# Patient Record
Sex: FEMALE | NOT HISPANIC OR LATINO | URBAN - METROPOLITAN AREA
[De-identification: names, ages, dates, MRNs, and addresses within clinical notes are randomized per-mention and may not be internally consistent; named-entity substitution may affect disease eponyms.]

---

## 2018-03-29 ENCOUNTER — TRANSCRIBE ORDERS (OUTPATIENT)
Dept: PHYSICAL THERAPY | Facility: HOSPITAL | Age: 68
End: 2018-03-29

## 2018-03-29 DIAGNOSIS — G89.29 CHRONIC BILATERAL BACK PAIN, UNSPECIFIED BACK LOCATION: Primary | ICD-10-CM

## 2018-03-29 DIAGNOSIS — M25.562 LEFT KNEE PAIN, UNSPECIFIED CHRONICITY: ICD-10-CM

## 2018-03-29 DIAGNOSIS — M54.9 CHRONIC BILATERAL BACK PAIN, UNSPECIFIED BACK LOCATION: Primary | ICD-10-CM

## 2018-03-29 DIAGNOSIS — M25.552 PAIN OF LEFT HIP JOINT: ICD-10-CM

## 2018-04-04 ENCOUNTER — APPOINTMENT (OUTPATIENT)
Dept: PHYSICAL THERAPY | Facility: HOSPITAL | Age: 68
End: 2018-04-04

## 2018-05-01 ENCOUNTER — HOSPITAL ENCOUNTER (OUTPATIENT)
Dept: PHYSICAL THERAPY | Facility: HOSPITAL | Age: 68
Setting detail: THERAPIES SERIES
Discharge: HOME | End: 2018-05-01

## 2018-05-09 ENCOUNTER — HOSPITAL ENCOUNTER (OUTPATIENT)
Dept: PHYSICAL THERAPY | Facility: HOSPITAL | Age: 68
Setting detail: THERAPIES SERIES
Discharge: HOME | End: 2018-05-09

## 2018-05-09 DIAGNOSIS — R53.1 GENERALIZED WEAKNESS: ICD-10-CM

## 2018-05-09 DIAGNOSIS — M54.50 CHRONIC BILATERAL LOW BACK PAIN WITHOUT SCIATICA: Primary | ICD-10-CM

## 2018-05-09 DIAGNOSIS — M25.562 CHRONIC PAIN OF LEFT KNEE: ICD-10-CM

## 2018-05-09 DIAGNOSIS — M19.90 ARTHRITIS: ICD-10-CM

## 2018-05-09 DIAGNOSIS — G89.29 CHRONIC BILATERAL LOW BACK PAIN WITHOUT SCIATICA: Primary | ICD-10-CM

## 2018-05-09 DIAGNOSIS — G89.29 CHRONIC PAIN OF LEFT KNEE: ICD-10-CM

## 2018-05-09 DIAGNOSIS — M25.50 MULTIPLE JOINT PAIN: ICD-10-CM

## 2018-05-09 PROCEDURE — G8979 MOBILITY GOAL STATUS: HCPCS | Performed by: PHYSICAL THERAPIST

## 2018-05-09 PROCEDURE — G8978 MOBILITY CURRENT STATUS: HCPCS | Performed by: PHYSICAL THERAPIST

## 2018-05-09 PROCEDURE — 97161 PT EVAL LOW COMPLEX 20 MIN: CPT | Performed by: PHYSICAL THERAPIST

## 2018-05-09 NOTE — THERAPY EVALUATION
Outpatient Physical Therapy Ortho Initial Evaluation   [facility]     Patient Name: Radha Del Rosario  : 1950  MRN: [id number]  Today's Date: 2018      Visit Date: 2018    There is no problem list on file for this patient.       No past medical history on file.     No past surgical history on file.    Visit Dx:     ICD-10-CM ICD-9-CM   1. Chronic bilateral low back pain without sciatica M54.5 724.2    G89.29 338.29   2. Chronic pain of left knee M25.562 719.46    G89.29 338.29   3. Generalized weakness R53.1 780.79   4. Multiple joint pain M25.50 719.49   5. Arthritis M19.90 716.90                 PT Ortho     Row Name 18 1600       Subjective Comments    Subjective Comments 67 y/o F referred to outpatient PT for LB, L hip and L knee pain. Also with c/o upper back and R sided pain.  Her dtr in law provides translation.  She c/o pain that is worse with walking. Her low back disturbs her sleep. She denies pain with sitting. She c/o intermittent numbness in her L ankle.  Bending forward provokes lumbar pain.  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  Currently does not participate in an exercise program   -GR       Subjective Pain    Able to rate subjective pain? yes  -GR    Pre-Treatment Pain Level 6  -GR    Post-Treatment Pain Level 6  -GR    Subjective Pain Comment generalized  -GR       Posture/Observations    Posture/Observations Comments Mild forward head, decreased lordosis  -GR       Quarter Clearing    Quarter Clearing Lower Quarter Clearing  -GR       DTR- Lower Quarter Clearing    Patellar tendon (L2-4) 2- Normal response  -GR    Achilles tendon (S1-2) 2- Normal response  -GR       Neural Tension Signs- Lower Quarter Clearing    SLR Left:;Positive  -GR       Myotomal Screen- Lower Quarter Clearing    Hip flexion (L2) Bilateral:;4 (Good)  -GR    Knee extension (L3) Bilateral:;4- (Good -)   -GR    Ankle DF (L4) Bilateral:;4 (Good)  -GR    Great toe extension (L5) Bilateral:;4 (Good)  -GR    Ankle PF (S1) Bilateral:;4 (Good)  -GR    Knee flexion (S2) Bilateral:;4- (Good -)  -GR       Lumbar ROM Screen- Lower Quarter Clearing    Lumbar Flexion Normal   with pain  -GR    Lumbar Extension Impaired   25% with pain  -GR    Lumbar Lateral Flexion Normal   past knee jt line  -GR    Lumbar Rotation Impaired   25% with pain R>L  -GR       SI/Hip Screen- Lower Quarter Clearing    Koko's/Colin's test Bilateral:;Negative  -GR       Special Tests/Palpation    Special Tests/Palpation Knee  -GR       Knee Special Tests    Lachman’s (ACL lesion) Bilateral:;Negative  -GR    Posterior drawer (PCL lesion) Bilateral:;Negative  -GR    Valgus stress (MCL lesion) Bilateral:;Negative  -GR    Varus stress (LCL lesion) Bilateral:;Negative  -GR    Knee Special Tests Comments TTP medial jt line L knee; B jt line R knee  -GR       General ROM    GENERAL ROM COMMENTS knee flex/ext 0-135 bilaterally  -GR      User Key  (r) = Recorded By, (t) = Taken By, (c) = Cosigned By    Initials Name Provider Type    GR Gerhard Varner, PT Physical Therapist                      Therapy Education  Education Details: educated family on arthritis and effects of mobility training; issued initial HEP and discussed follow up plan/expectations  Given: HEP, Symptoms/condition management  Program: New  How Provided: Verbal, Written  Provided to: Patient  Level of Understanding: Teach back education performed, Demonstrated           PT OP Goals     Row Name 05/09/18 1800          PT Short Term Goals    STG Date to Achieve 05/24/18  -GR     STG 1 Patient/family will be independent with completion of initial HEP.  -GR     STG 1 Progress New  -GR     STG 2 Patient will be compliant with walking >10 minutes >3 days a week for optimal symptom management.  -GR     STG 2 Progress New  -GR     STG 3 Patient will report 25% reduction in pain with sleeping.   -GR     STG 3 Progress New  -GR        Long Term Goals    LTG Date to Achieve 06/23/18  -GR     LTG 1 Patient/family will be independent with progressive HEP for long term management of current condition.  -GR     LTG 1 Progress New  -GR     LTG 2 Patient will score </=38% disability on the KOS-ADL to indicate improved perceived performance with ADLs.  -GR     LTG 2 Progress New  -GR     LTG 3 Patient will report 75% or greater reduction in pain with sleeping.  -GR     LTG 3 Progress New  -GR     LTG 4 Patient will be compliant with walking >/=30 minutes/day 3x/week for optimal arthritis management.  -GR     LTG 4 Progress New  -GR        Time Calculation    PT Goal Re-Cert Due Date 08/07/18  -GR       User Key  (r) = Recorded By, (t) = Taken By, (c) = Cosigned By    Initials Name Provider Type    LEFTY Varner, PT Physical Therapist                PT Assessment/Plan     Row Name 05/09/18 1828          PT Assessment    Functional Limitations Limitation in home management;Performance in leisure activities  -     Impairments Endurance;Gait;Pain;Posture;Poor body mechanics;Muscle strength  -GR     Assessment Comments 67 y/o F referred to outpatient PT for evaluation of back, L hip and L knee pain; patient with c/o widespread jt pain, insidious onset x 1 year. Translation provided by patient son and dtr in law at her request (declined interpretive services).  She presents with limited and painful lumbar ext and rotation.  Myotome strength is good.  Note palpable tenderness to the medial jt line of her L knee and both lateral and medial R knee jt lines.  Special testing is unremarkable. Her s/s are consistent with arthritis and she is likely to benefit from skilled PT intervention to address her current impairments. Recommend trial 2x/week x 4 weeks with goal to transition to independent home program for long term condition management. Her condition is stable.  Thank you for this referral.  -GR     Please refer  to paper survey for additional self-reported information Yes  -GR     Rehab Potential Good  -GR     Patient/caregiver participated in establishment of treatment plan and goals Yes  -GR     Patient would benefit from skilled therapy intervention Yes  -GR        PT Plan    PT Frequency 2x/week  -GR     Predicted Duration of Therapy Intervention (OT Eval) 4 weeks  -GR     Planned CPT's? PT EVAL LOW COMPLEXITY: 06324;PT RE-EVAL: 83867;PT THER PROC EA 15 MIN: 90247;PT THER ACT EA 15 MIN: 05038;PT MANUAL THERAPY EA 15 MIN: 60061;PT NEUROMUSC RE-EDUCATION EA 15 MIN: 46642;PT GAIT TRAINING EA 15 MIN: 95449;PT AQUATIC THERAPY EA 15 MIN: 36828;PT SELF CARE/HOME MGMT/TRAIN EA 15: 84385;PT HOT OR COLD PACK TREAT MCARE;PT ELECTRICAL STIM UNATTEND: ;PT ULTRASOUND EA 15 MIN: 75412  -GR     Physical Therapy Interventions (Optional Details) aquatics exercise;balance training;home exercise program;gait training;lumbar stabilization;manual therapy techniques;modalities;neuromuscular re-education;patient/family education;postural re-education;ROM (Range of Motion);strengthening;stretching;swiss ball techniques;transfer training  -GR     PT Plan Comments Begin with nustep. Review/perform HEP.  Progress with gentle aerobic activity and lower body/core strengthening within patient tolerance.  -GR       User Key  (r) = Recorded By, (t) = Taken By, (c) = Cosigned By    Initials Name Provider Type    GR Gerhard T Gardenia, PT Physical Therapist                  Exercises     Row Name 05/09/18 1600             Subjective Comments    Subjective Comments 67 y/o F referred to outpatient PT for LB, L hip and L knee pain. Also with c/o upper back and R sided pain.  Her dtr in law provides translation.  She c/o pain that is worse with walking. Her low back disturbs her sleep. She denies pain with sitting. She c/o intermittent numbness in her L ankle.  Bending forward provokes lumbar pain.  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  Currently does not participate in an exercise program   -GR         Subjective Pain    Able to rate subjective pain? yes  -GR      Pre-Treatment Pain Level 6  -GR      Post-Treatment Pain Level 6  -GR      Subjective Pain Comment generalized  -GR         Exercise 1    Exercise Name 1 Quad set B  -GR      Cueing 1 Demo;Tactile  -GR      Sets 1 1  -GR      Reps 1 10  -GR         Exercise 2    Exercise Name 2 LTR  -GR      Cueing 2 Demo;Tactile  -GR      Sets 2 1  -GR      Reps 2 10  -GR         Exercise 3    Exercise Name 3 Piriformis stretch H/L  -GR      Cueing 3 Demo;Tactile  -GR      Sets 3 1  -GR      Reps 3 3  -GR      Time 3 20 seconds  -GR         Exercise 4    Exercise Name 4 SKTC  -GR      Cueing 4 Demo;Tactile  -GR      Sets 4 1  -GR      Reps 4 3  -GR      Time 4 5 seconds  -GR         Exercise 5    Exercise Name 5 Nustep 1 min - demo  -GR        User Key  (r) = Recorded By, (t) = Taken By, (c) = Cosigned By    Initials Name Provider Type    GR Gerhard Varner, PT Physical Therapist                        Outcome Measure Options: Knee Outcome Score- ADL  Knee Outcome Score  Knee Outcome Score Comments: 48.75% disability      Time Calculation:   Start Time: 1600  Stop Time: 1632  Time Calculation (min): 32 min  Total Timed Code Minutes- PT: 0 minute(s)     Therapy Charges for Today     Code Description Service Date Service Provider Modifiers Qty    77997631863 HC PT MOBILITY CURRENT 5/9/2018 Gerhard Varner, PT GP, CK 1    32982471621 HC PT MOBILITY PROJECTED 5/9/2018 Gerhard Varner PT GP, CI 1    61466164240 HC PT EVAL LOW COMPLEXITY 2 5/9/2018 Gerhard Varner, PT GP 1          PT G-Codes  PT Professional Judgement Used?: Yes  Outcome Measure Options: Knee Outcome Score- ADL  Score: 48%  Functional Limitation: Mobility: Walking and moving around  Mobility: Walking and Moving Around Current Status (): At least 40  percent but less than 60 percent impaired, limited or restricted  Mobility: Walking and Moving Around Goal Status (): At least 1 percent but less than 20 percent impaired, limited or restricted         Gerhard Varner, PT  5/9/2018

## 2018-05-17 ENCOUNTER — HOSPITAL ENCOUNTER (OUTPATIENT)
Dept: PHYSICAL THERAPY | Facility: HOSPITAL | Age: 68
Setting detail: THERAPIES SERIES
Discharge: HOME | End: 2018-05-17

## 2018-05-17 DIAGNOSIS — M25.50 MULTIPLE JOINT PAIN: ICD-10-CM

## 2018-05-17 DIAGNOSIS — G89.29 CHRONIC BILATERAL LOW BACK PAIN WITHOUT SCIATICA: Primary | ICD-10-CM

## 2018-05-17 DIAGNOSIS — R53.1 GENERALIZED WEAKNESS: ICD-10-CM

## 2018-05-17 DIAGNOSIS — G89.29 CHRONIC PAIN OF LEFT KNEE: ICD-10-CM

## 2018-05-17 DIAGNOSIS — M19.90 ARTHRITIS: ICD-10-CM

## 2018-05-17 DIAGNOSIS — M54.50 CHRONIC BILATERAL LOW BACK PAIN WITHOUT SCIATICA: Primary | ICD-10-CM

## 2018-05-17 DIAGNOSIS — M25.562 CHRONIC PAIN OF LEFT KNEE: ICD-10-CM

## 2018-05-17 PROCEDURE — 97110 THERAPEUTIC EXERCISES: CPT

## 2018-05-17 NOTE — THERAPY TREATMENT NOTE
Outpatient Physical Therapy Ortho Treatment Note   [facility]     Patient Name: Radha Del Rosario  : 1950  MRN: [id number]  Today's Date: 2018      Visit Date: 2018    Visit Dx:    ICD-10-CM ICD-9-CM   1. Chronic bilateral low back pain without sciatica M54.5 724.2    G89.29 338.29   2. Chronic pain of left knee M25.562 719.46    G89.29 338.29   3. Generalized weakness R53.1 780.79   4. Multiple joint pain M25.50 719.49   5. Arthritis M19.90 716.90       There is no problem list on file for this patient.       No past medical history on file.     No past surgical history on file.                          PT Assessment/Plan     Row Name 18 1725          PT Assessment    Assessment Comments Pt returns for first follow up visit since evaluation reporting dec overall pain with 5/10 pain in L hip and thoracic spine. Daughter in law present to translate today. Pt tolerated LE flexibility and low level strengthening well. She had difficulty performing HL TA with dec ability to perform PPT and difficulty comprehending instructions. Pt reports 0/10 pain following PT.   -LS        PT Plan    PT Plan Comments Assess tolerance to HEP, attempt increased repetitions of exercises, continue general strengthening to address core weakness, improve arthritic joint pain.   -LS       User Key  (r) = Recorded By, (t) = Taken By, (c) = Cosigned By    Initials Name Provider Type    LS Maya Rosen, PT Physical Therapist                    Exercises     Row Name 18 1600             Subjective Comments    Subjective Comments I feel better today. I have pain in my L hip.  -LS         Subjective Pain    Able to rate subjective pain? yes  -LS      Pre-Treatment Pain Level 5  -LS         Exercise 1    Exercise Name 1 Quad set B  -LS      Cueing 1 Demo;Tactile  -LS      Sets 1 1  -LS      Reps 1 10  -LS         Exercise 2    Exercise Name 2 LTR  -LS      Cueing 2 Demo;Tactile  -LS      Sets 2 1  -LS      Reps  2 10  -LS         Exercise 3    Exercise Name 3 Piriformis stretch H/L  -LS      Cueing 3 Demo;Tactile  -LS      Sets 3 1  -LS      Reps 3 3  -LS      Time 3 20 seconds  -LS         Exercise 4    Exercise Name 4 SKTC  -LS      Cueing 4 Demo;Tactile  -LS      Sets 4 1  -LS      Reps 4 3  -LS      Time 4 5 seconds  -LS         Exercise 5    Exercise Name 5 Nustep  -LS      Time 5 5 minutes Level 3  -LS         Exercise 6    Exercise Name 6 seated HS stretch  -LS      Reps 6 3  -LS      Time 6 20  -LS         Exercise 7    Exercise Name 7 SKTC   -LS      Reps 7 3  -LS      Time 7 20  -LS         Exercise 8    Exercise Name 8 isometric hip adduction  -LS      Reps 8 10  -LS      Time 8 5  -LS         Exercise 9    Exercise Name 9 HL hip abduction  -LS      Reps 9 12  -LS      Additional Comments 10 B/ 2 uni each side  -LS         Exercise 10    Exercise Name 10 SAQ  -LS      Reps 10 10  -LS      Additional Comments B; 2#  -LS         Exercise 11    Exercise Name 11 attempted PPT but difficulty translating  -LS      Reps 11 --  -LS      Time 11 --  -LS         Exercise 12    Exercise Name 12 standing wall wash  -LS      Reps 12 10  -LS      Time 12 5  -LS         Exercise 13    Exercise Name 13 standing theraband row  -LS      Reps 13 10  -LS      Additional Comments RTB  -LS        User Key  (r) = Recorded By, (t) = Taken By, (c) = Cosigned By    Initials Name Provider Type    LS Maya Rosen, PT Physical Therapist                               PT OP Goals     Row Name 05/17/18 1700          PT Short Term Goals    STG Date to Achieve 05/24/18  -LS     STG 1 Patient/family will be independent with completion of initial HEP.  -LS     STG 1 Progress Progressing  -LS     STG 1 Progress Comments Pt doing well with initial program, progressed today.  -LS     STG 2 Patient will be compliant with walking >10 minutes >3 days a week for optimal symptom management.  -LS     STG 2 Progress Ongoing  -LS     STG 3 Patient will  report 25% reduction in pain with sleeping.  -LS     STG 3 Progress Ongoing  -LS     STG 3 Progress Comments Pt reports reduced pain and improve sleeping.  -LS        Long Term Goals    LTG Date to Achieve 06/23/18  -LS     LTG 1 Patient/family will be independent with progressive HEP for long term management of current condition.  -LS     LTG 1 Progress Ongoing  -LS     LTG 2 Patient will score </=38% disability on the KOS-ADL to indicate improved perceived performance with ADLs.  -LS     LTG 2 Progress Ongoing  -LS     LTG 3 Patient will report 75% or greater reduction in pain with sleeping.  -LS     LTG 3 Progress Ongoing  -LS     LTG 4 Patient will be compliant with walking >/=30 minutes/day 3x/week for optimal arthritis management.  -LS     LTG 4 Progress Ongoing  -LS       User Key  (r) = Recorded By, (t) = Taken By, (c) = Cosigned By    Initials Name Provider Type    LS Maya Rosen, PT Physical Therapist          Therapy Education  Given: HEP, Mobility training, Symptoms/condition management  Program: Reinforced  How Provided: Verbal, Demonstration  Provided to: Patient, Caregiver (daughter in law who was translating)  Level of Understanding: Teach back education performed, Verbalized              Time Calculation:   Start Time: 1600  Stop Time: 1645  Time Calculation (min): 45 min  Total Timed Code Minutes- PT: 42 minute(s)    Therapy Charges for Today     Code Description Service Date Service Provider Modifiers Qty    76128450053 HC PT THER PROC EA 15 MIN 5/17/2018 Maya Rosen, PT GP 3                    Maya Rosen, PT  5/17/2018

## 2018-05-21 ENCOUNTER — APPOINTMENT (OUTPATIENT)
Dept: PHYSICAL THERAPY | Facility: HOSPITAL | Age: 68
End: 2018-05-21

## 2018-05-24 ENCOUNTER — HOSPITAL ENCOUNTER (OUTPATIENT)
Dept: PHYSICAL THERAPY | Facility: HOSPITAL | Age: 68
Setting detail: THERAPIES SERIES
Discharge: HOME | End: 2018-05-24

## 2018-05-24 DIAGNOSIS — M25.50 MULTIPLE JOINT PAIN: ICD-10-CM

## 2018-05-24 DIAGNOSIS — G89.29 CHRONIC BILATERAL LOW BACK PAIN WITHOUT SCIATICA: Primary | ICD-10-CM

## 2018-05-24 DIAGNOSIS — M54.50 CHRONIC BILATERAL LOW BACK PAIN WITHOUT SCIATICA: Primary | ICD-10-CM

## 2018-05-24 DIAGNOSIS — R53.1 GENERALIZED WEAKNESS: ICD-10-CM

## 2018-05-24 DIAGNOSIS — M19.90 ARTHRITIS: ICD-10-CM

## 2018-05-24 DIAGNOSIS — M25.562 CHRONIC PAIN OF LEFT KNEE: ICD-10-CM

## 2018-05-24 DIAGNOSIS — G89.29 CHRONIC PAIN OF LEFT KNEE: ICD-10-CM

## 2018-05-24 PROCEDURE — 97110 THERAPEUTIC EXERCISES: CPT

## 2018-05-25 NOTE — THERAPY TREATMENT NOTE
Outpatient Physical Therapy Ortho Treatment Note   [facility]     Patient Name: Radha Del Rosario  : 1950  MRN: [id number]  Today's Date: 2018      Visit Date: 2018    Visit Dx:    ICD-10-CM ICD-9-CM   1. Chronic bilateral low back pain without sciatica M54.5 724.2    G89.29 338.29   2. Chronic pain of left knee M25.562 719.46    G89.29 338.29   3. Generalized weakness R53.1 780.79   4. Multiple joint pain M25.50 719.49   5. Arthritis M19.90 716.90       There is no problem list on file for this patient.       No past medical history on file.     No past surgical history on file.                          PT Assessment/Plan     Row Name 18 1308          PT Assessment    Assessment Comments Pt and family report low pain levels and good tolerance to increasing standing exercises as well as repetitions today. Pt demonstrates excellent LE flexibility and therefore reduced flexibility exercises today for more strengthening focus. Pt with pain in low back with attempted bridge, otherwise no reports of discomfort or signs of distress. Pt's family member present to translate.   -LS        PT Plan    PT Plan Comments Continue general strengthening. Attempt core strengthening with possible TA/PPT if translation allows. Prior attempt was difficult due to pt's difficulty and translation.   -LS       User Key  (r) = Recorded By, (t) = Taken By, (c) = Cosigned By    Initials Name Provider Type    LS Maya Rosen, PT Physical Therapist                    Exercises     Row Name 18 1600             Subjective Comments    Subjective Comments I was not sore after last time. I had a colonoscopy Tuesday. My hip is a little sore but not bad.  -LS         Subjective Pain    Able to rate subjective pain? yes  -LS      Pre-Treatment Pain Level 2  -LS         Exercise 1    Exercise Name 1 Quad set B  -LS      Cueing 1 Demo;Tactile  -LS      Sets 1 2  -LS      Reps 1 10  -LS         Exercise 2    Exercise  Name 2 LTR  -LS      Cueing 2 Demo;Tactile  -LS      Sets 2 2  -LS      Reps 2 10  -LS         Exercise 3    Exercise Name 3 Piriformis stretch H/L  -LS      Cueing 3 Demo;Tactile  -LS      Sets 3 1  -LS      Reps 3 3  -LS      Time 3 20 seconds  -LS      Additional Comments only 1 rep today; pt with little benefit due to good flexibility  -LS         Exercise 4    Exercise Name 4 SKTC  -LS      Cueing 4 Demo;Tactile  -LS      Sets 4 1  -LS      Reps 4 3  -LS      Time 4 5 seconds  -LS         Exercise 5    Exercise Name 5 Nustep  -LS      Time 5 5 minutes Level 3  -LS         Exercise 6    Exercise Name 6 seated HS stretch  -LS      Reps 6 3  -LS      Time 6 20  -LS         Exercise 7    Exercise Name 7 --  -LS      Reps 7 --  -LS      Time 7 --  -LS         Exercise 8    Exercise Name 8 isometric hip adduction  -LS      Sets 8 2  -LS      Reps 8 10  -LS      Time 8 5  -LS         Exercise 9    Exercise Name 9 HL hip abduction  -LS      Sets 9 2  -LS      Reps 9 10  -LS      Additional Comments 10 B/ 10 uni RTB  -LS         Exercise 10    Exercise Name 10 SAQ  -LS      Sets 10 2  -LS      Reps 10 10  -LS      Additional Comments 2#  -LS         Exercise 11    Exercise Name 11 seated HS curl  -LS      Sets 11 2  -LS      Reps 11 10  -LS      Additional Comments B; RTB  -LS         Exercise 12    Exercise Name 12 standing wall wash  -LS      Reps 12 10  -LS      Time 12 5  -LS         Exercise 13    Exercise Name 13 standing theraband row  -LS      Sets 13 2  -LS      Reps 13 10  -LS      Additional Comments RTB  -LS         Exercise 14    Exercise Name 14 standing hip abduction  -LS      Sets 14 2  -LS      Reps 14 10  -LS      Additional Comments B  -LS         Exercise 15    Exercise Name 15 standing HS curl  -LS      Sets 15 2  -LS      Reps 15 10  -LS         Exercise 16    Exercise Name 16 HR  -LS      Sets 16 2  -LS      Reps 16 10  -LS        User Key  (r) = Recorded By, (t) = Taken By, (c) = Cosigned By     Initials Name Provider Type    LS Maya Rosen PT Physical Therapist                               PT OP Goals     Row Name 05/25/18 1300          PT Short Term Goals    STG Date to Achieve 05/24/18  -LS     STG 1 Patient/family will be independent with completion of initial HEP.  -LS     STG 1 Progress Met  -LS     STG 1 Progress Comments Pt doing well with HEP.  -LS     STG 2 Patient will be compliant with walking >10 minutes >3 days a week for optimal symptom management.  -LS     STG 2 Progress Ongoing  -LS     STG 3 Patient will report 25% reduction in pain with sleeping.  -LS     STG 3 Progress Ongoing  -LS        Long Term Goals    LTG Date to Achieve 06/23/18  -LS     LTG 1 Patient/family will be independent with progressive HEP for long term management of current condition.  -LS     LTG 1 Progress Ongoing  -LS     LTG 2 Patient will score </=38% disability on the KOS-ADL to indicate improved perceived performance with ADLs.  -LS     LTG 2 Progress Ongoing  -LS     LTG 3 Patient will report 75% or greater reduction in pain with sleeping.  -LS     LTG 3 Progress Ongoing  -LS     LTG 4 Patient will be compliant with walking >/=30 minutes/day 3x/week for optimal arthritis management.  -LS     LTG 4 Progress Ongoing  -LS       User Key  (r) = Recorded By, (t) = Taken By, (c) = Cosigned By    Initials Name Provider Type    MARIELA Rosen PT Physical Therapist          Therapy Education  Given: Symptoms/condition management  Program: Reinforced  How Provided: Verbal, Demonstration  Provided to: Patient, Caregiver  Level of Understanding: Teach back education performed, Verbalized              Time Calculation:   Start Time: 1645  Stop Time: 1730  Time Calculation (min): 45 min  Total Timed Code Minutes- PT: 42 minute(s)    Therapy Charges for Today     Code Description Service Date Service Provider Modifiers Qty    35229288712 HC PT THER PROC EA 15 MIN 5/24/2018 Maya Rosen, VU GP 3                    Maya  Silas, PT  5/25/2018

## 2018-06-07 ENCOUNTER — HOSPITAL ENCOUNTER (OUTPATIENT)
Dept: PHYSICAL THERAPY | Facility: HOSPITAL | Age: 68
Setting detail: THERAPIES SERIES
Discharge: HOME | End: 2018-06-07

## 2018-06-07 DIAGNOSIS — R53.1 GENERALIZED WEAKNESS: ICD-10-CM

## 2018-06-07 DIAGNOSIS — M19.90 ARTHRITIS: ICD-10-CM

## 2018-06-07 DIAGNOSIS — G89.29 CHRONIC PAIN OF LEFT KNEE: ICD-10-CM

## 2018-06-07 DIAGNOSIS — G89.29 CHRONIC BILATERAL LOW BACK PAIN WITHOUT SCIATICA: Primary | ICD-10-CM

## 2018-06-07 DIAGNOSIS — M25.50 MULTIPLE JOINT PAIN: ICD-10-CM

## 2018-06-07 DIAGNOSIS — M54.50 CHRONIC BILATERAL LOW BACK PAIN WITHOUT SCIATICA: Primary | ICD-10-CM

## 2018-06-07 DIAGNOSIS — M25.562 CHRONIC PAIN OF LEFT KNEE: ICD-10-CM

## 2018-06-07 PROCEDURE — 97110 THERAPEUTIC EXERCISES: CPT | Performed by: PHYSICAL THERAPIST

## 2018-06-07 NOTE — THERAPY RE-EVALUATION
Outpatient Physical Therapy Ortho Re-Assessment  [facility]     Patient Name: Radha Del Rosario  : 1950  MRN: [id number]  Today's Date: 2018      Visit Date: 2018    There is no problem list on file for this patient.       No past medical history on file.     No past surgical history on file.    Visit Dx:     ICD-10-CM ICD-9-CM   1. Chronic bilateral low back pain without sciatica M54.5 724.2    G89.29 338.29   2. Chronic pain of left knee M25.562 719.46    G89.29 338.29   3. Generalized weakness R53.1 780.79   4. Multiple joint pain M25.50 719.49   5. Arthritis M19.90 716.90                 PT Ortho     Row Name 18 1700       Myotomal Screen- Lower Quarter Clearing    Hip flexion (L2) Bilateral:;4+ (Good +)  -GR    Knee extension (L3) Bilateral:;4 (Good)  -GR    Ankle DF (L4) Bilateral:;4 (Good)  -GR    Knee flexion (S2) Bilateral:;4 (Good)  -GR       Lumbar ROM Screen- Lower Quarter Clearing    Lumbar Flexion Normal  -GR    Lumbar Extension Impaired   50%  -GR    Lumbar Lateral Flexion Normal  -GR    Lumbar Rotation Normal  -GR      User Key  (r) = Recorded By, (t) = Taken By, (c) = Cosigned By    Initials Name Provider Type    LEFTY Varner, PT Physical Therapist                                  PT OP Goals     Row Name 18 1700          PT Short Term Goals    STG Date to Achieve 18  -GR     STG 1 Patient/family will be independent with completion of initial HEP.  -GR     STG 1 Progress Met  -GR     STG 2 Patient will be compliant with walking >10 minutes >3 days a week for optimal symptom management.  -GR     STG 2 Progress Met  -GR     STG 2 Progress Comments per dtr in law; also performing HEP daily  -GR     STG 3 Patient will report 25% reduction in pain with sleeping.  -GR     STG 3 Progress Met  -GR        Long Term Goals    LTG Date to Achieve 18  -GR     LTG 1 Patient/family will be independent with progressive HEP for long term management of current  condition.  -GR     LTG 1 Progress Ongoing  -GR     LTG 2 Patient will score </=38% disability on the KOS-ADL to indicate improved perceived performance with ADLs.  -GR     LTG 2 Progress Partially Met;Ongoing  -GR     LTG 2 Progress Comments 28% on DORCAS: KOS-ADL not taken  -GR     LTG 3 Patient will report 75% or greater reduction in pain with sleeping.  -GR     LTG 3 Progress Ongoing  -GR     LTG 4 Patient will be compliant with walking >/=30 minutes/day 3x/week for optimal arthritis management.  -GR     LTG 4 Progress Ongoing  -GR       User Key  (r) = Recorded By, (t) = Taken By, (c) = Cosigned By    Initials Name Provider Type    LEFTY Varner PT Physical Therapist                PT Assessment/Plan     Row Name 06/07/18 1803          PT Assessment    Assessment Comments Ms. Del Rosario has attended 4 sessions of skilled PT.  Current level of disability per modified oswestry outcome measure is 28% where 100% = complete disability.  She demonstrates improved quad and hamstring strength per MMT. Continues to require verbal, tactile and demo cueing for body mechanics with progressive exercises and would benefit from additional sessions to address her remaining deficits. Recommend continue 2x/week x 3 weeks. Thank you for this referral.  -GR     Please refer to paper survey for additional self-reported information Yes  -GR     Rehab Potential Good  -GR     Patient/caregiver participated in establishment of treatment plan and goals Yes  -GR        PT Plan    Predicted Duration of Therapy Intervention (OT Eval) 3 weeks  -GR     PT Plan Comments Continue with core/hip girdle strengthening. Reinforce body mechanics as tolerated.  -GR       User Key  (r) = Recorded By, (t) = Taken By, (c) = Cosigned By    Initials Name Provider Type    LEFTY Varner PT Physical Therapist                  Exercises     Row Name 06/07/18 1600             Subjective Comments    Subjective Comments Feels like the therapy is helping,  learning new things.  -GR         Subjective Pain    Able to rate subjective pain? yes  -GR      Pre-Treatment Pain Level 3  -GR         Exercise 1    Exercise Name 1 Quad set B  -GR      Cueing 1 Demo;Tactile  -GR      Sets 1 2  -GR      Reps 1 10  -GR         Exercise 2    Exercise Name 2 LTR  -GR      Cueing 2 Demo;Tactile  -GR      Sets 2 2  -GR      Reps 2 10  -GR      Additional Comments with swiss ball  -GR         Exercise 4    Exercise Name 4 DKTC with swiss ball  -GR      Cueing 4 Demo  -GR      Sets 4 2  -GR      Reps 4 10  -GR         Exercise 5    Exercise Name 5 Nustep UE/LE  -GR      Time 5 5 minutes Level 5  -GR         Exercise 6    Exercise Name 6 HS stretch with strap  -GR      Reps 6 3  -GR      Time 6 20  -GR      Additional Comments stopped due to lack of posterior stretch  -GR         Exercise 8    Exercise Name 8 isometric hip adduction  -GR      Sets 8 2  -GR      Reps 8 10  -GR      Time 8 5  -GR         Exercise 9    Exercise Name 9 HL hip abduction  -GR      Sets 9 2  -GR      Reps 9 10  -GR      Additional Comments GTB B/GTB uni  -GR         Exercise 14    Exercise Name 14 standing hip abduction  -GR      Cueing 14 Demo  -GR      Sets 14 2  -GR      Reps 14 10  -GR      Additional Comments each  -GR         Exercise 15    Exercise Name 15 standing HS curl  -GR      Cueing 15 Demo  -GR      Sets 15 2  -GR      Reps 15 10  -GR      Additional Comments each  -GR         Exercise 16    Exercise Name 16 Heel raises  -GR      Cueing 16 Demo  -GR      Sets 16 2  -GR      Reps 16 10  -GR      Additional Comments each  -GR         Exercise 17    Exercise Name 17 PPT using feet to set  -GR      Cueing 17 Demo  -GR      Sets 17 2  -GR      Reps 17 10  -GR         Exercise 18    Exercise Name 18 Bridge  -GR      Cueing 18 Demo  -GR      Sets 18 2  -GR      Reps 18 10  -GR        User Key  (r) = Recorded By, (t) = Taken By, (c) = Cosigned By    Initials Name Provider Type    GR Gerhard Varner, PT  Physical Therapist                        Outcome Measure Options: Modifed Win  Modified Oswestry  Modified Oswestry Score/Comments: 28%      Time Calculation:   Start Time: 1630  Stop Time: 1715  Time Calculation (min): 45 min  Total Timed Code Minutes- PT: 45 minute(s)     Therapy Charges for Today     Code Description Service Date Service Provider Modifiers Qty    01923655099 HC PT THER PROC EA 15 MIN 6/7/2018 Gerhard Varner, PT GP 3          PT G-Codes  Outcome Measure Options: Modifed Emy         Gerhard Varner, PT  6/7/2018

## 2018-06-11 ENCOUNTER — APPOINTMENT (OUTPATIENT)
Dept: PHYSICAL THERAPY | Facility: HOSPITAL | Age: 68
End: 2018-06-11

## 2018-06-14 ENCOUNTER — HOSPITAL ENCOUNTER (OUTPATIENT)
Dept: PHYSICAL THERAPY | Facility: HOSPITAL | Age: 68
Setting detail: THERAPIES SERIES
Discharge: HOME | End: 2018-06-14

## 2018-06-14 DIAGNOSIS — G89.29 CHRONIC PAIN OF LEFT KNEE: ICD-10-CM

## 2018-06-14 DIAGNOSIS — M25.562 CHRONIC PAIN OF LEFT KNEE: ICD-10-CM

## 2018-06-14 DIAGNOSIS — R53.1 GENERALIZED WEAKNESS: ICD-10-CM

## 2018-06-14 DIAGNOSIS — M54.50 CHRONIC BILATERAL LOW BACK PAIN WITHOUT SCIATICA: Primary | ICD-10-CM

## 2018-06-14 DIAGNOSIS — G89.29 CHRONIC BILATERAL LOW BACK PAIN WITHOUT SCIATICA: Primary | ICD-10-CM

## 2018-06-14 DIAGNOSIS — M19.90 ARTHRITIS: ICD-10-CM

## 2018-06-14 DIAGNOSIS — M25.50 MULTIPLE JOINT PAIN: ICD-10-CM

## 2018-06-14 PROCEDURE — 97110 THERAPEUTIC EXERCISES: CPT

## 2018-06-14 NOTE — THERAPY TREATMENT NOTE
Outpatient Physical Therapy Ortho Treatment Note  [facility]     Patient Name: Radha Del Rosario  : 1950  MRN: [id number]  Today's Date: 2018      Visit Date: 2018    Visit Dx:    ICD-10-CM ICD-9-CM   1. Chronic bilateral low back pain without sciatica M54.5 724.2    G89.29 338.29   2. Chronic pain of left knee M25.562 719.46    G89.29 338.29   3. Generalized weakness R53.1 780.79   4. Multiple joint pain M25.50 719.49   5. Arthritis M19.90 716.90       There is no problem list on file for this patient.       No past medical history on file.     No past surgical history on file.                          PT Assessment/Plan     Row Name 18 1710          PT Assessment    Assessment Comments Tolerated weights with secveral open chain exercises. Continue to work on general hip/LE/core strenthening  -WS       User Key  (r) = Recorded By, (t) = Taken By, (c) = Cosigned By    Initials Name Provider Type    SANJEEV Talavera PTA Physical Therapy Assistant                    Exercises     Row Name 18 1640             Subjective Comments    Subjective Comments A little pain in knee  -WS         Subjective Pain    Able to rate subjective pain? yes  -WS      Pre-Treatment Pain Level 3  -WS         Total Minutes    73972 - PT Therapeutic Exercise Minutes 30  -WS         Exercise 1    Exercise Name 1 Quad set B  -WS      Cueing 1 Demo;Tactile  -WS      Sets 1 2  -WS      Reps 1 10  -WS         Exercise 2    Exercise Name 2 LTR  -WS      Cueing 2 Demo;Tactile  -WS      Sets 2 2  -WS      Reps 2 10  -WS      Additional Comments with swiss ball  -WS         Exercise 4    Exercise Name 4 DKTC with swiss ball  -WS      Cueing 4 Demo  -WS      Sets 4 2  -WS      Reps 4 10  -WS      Time 4 5 seconds  -WS         Exercise 5    Exercise Name 5 Nustep UE/LE  -WS      Time 5 5 min level 5  -WS         Exercise 8    Exercise Name 8 isometric hip adduction  -WS      Sets 8 2  -WS      Reps 8 10  -WS       Time 8 5  -WS      Additional Comments ball  -WS         Exercise 9    Exercise Name 9 HL hip abduction  -WS      Sets 9 2  -WS      Reps 9 10  -WS      Additional Comments GTB B/uni  -WS         Exercise 10    Exercise Name 10 LAQ  -WS      Sets 10 2  -WS      Reps 10 10  -WS      Additional Comments 2#  -WS         Exercise 14    Exercise Name 14 standing hip abduction  -WS      Cueing 14 Demo  -WS      Sets 14 2  -WS      Reps 14 10  -WS      Additional Comments 2#  -WS         Exercise 15    Exercise Name 15 standing HS curl  -WS      Cueing 15 Demo  -WS      Sets 15 2  -WS      Reps 15 10  -WS      Additional Comments 2#  -WS         Exercise 16    Exercise Name 16 Heel raises  -WS      Cueing 16 Demo  -WS      Sets 16 2  -WS      Reps 16 10  -WS         Exercise 17    Exercise Name 17 PPT using feet to set  -WS      Cueing 17 Demo  -WS      Sets 17 2  -WS      Reps 17 10  -WS         Exercise 18    Exercise Name 18 Bridge  -WS      Cueing 18 Demo  -WS      Sets 18 2  -WS      Reps 18 10  -WS        User Key  (r) = Recorded By, (t) = Taken By, (c) = Cosigned By    Initials Name Provider Type    SANJEEV Talavera, PTA Physical Therapy Assistant                               PT OP Goals     Row Name 06/14/18 1700          PT Short Term Goals    STG Date to Achieve 05/24/18  -     STG 1 Patient/family will be independent with completion of initial HEP.  -     STG 1 Progress Met  -WS     STG 2 Patient will be compliant with walking >10 minutes >3 days a week for optimal symptom management.  -     STG 2 Progress Met  -     STG 3 Patient will report 25% reduction in pain with sleeping.  -     STG 3 Progress Met  -        Long Term Goals    LTG Date to Achieve 06/23/18  -WS     LTG 1 Patient/family will be independent with progressive HEP for long term management of current condition.  -     LTG 1 Progress Ongoing  -WS     LTG 2 Patient will score </=38% disability on the KOS-ADL to indicate improved  perceived performance with ADLs.  -     LTG 2 Progress Partially Met;Ongoing  -     LTG 3 Patient will report 75% or greater reduction in pain with sleeping.  -     LTG 3 Progress Ongoing  -     LTG 4 Patient will be compliant with walking >/=30 minutes/day 3x/week for optimal arthritis management.  -     LTG 4 Progress Ongoing  -       User Key  (r) = Recorded By, (t) = Taken By, (c) = Cosigned By    Initials Name Provider Type    SANJEEV Talavera PTA Physical Therapy Assistant          Therapy Education  Given: HEP  Program: Reinforced  How Provided: Verbal  Provided to: Patient              Time Calculation:   Start Time: 1640  Stop Time: 1710  Time Calculation (min): 30 min  Therapy Suggested Charges     Code   Minutes Charges    37659 (CPT®) Hc Pt Neuromusc Re Education Ea 15 Min      05637 (CPT®) Hc Pt Ther Proc Ea 15 Min 30 2    79854 (CPT®) Hc Gait Training Ea 15 Min      23735 (CPT®) Hc Pt Therapeutic Act Ea 15 Min      08024 (CPT®) Hc Pt Manual Therapy Ea 15 Min      87384 (CPT®) Hc Pt Ther Massage- Per 15 Min      57057 (CPT®) Hc Pt Iontophoresis Ea 15 Min      45241 (CPT®) Hc Pt Elec Stim Ea-Per 15 Min      25599 (CPT®) Hc Pt Ultrasound Ea 15 Min      67317 (CPT®) Hc Pt Self Care/Mgmt/Train Ea 15 Min      Total  30 2        Therapy Charges for Today     Code Description Service Date Service Provider Modifiers Qty    76858014941 HC PT THER PROC EA 15 MIN 6/14/2018 Lj Talavera PTA GP 2                    Lj Talavera PTA  6/14/2018

## 2018-07-06 ENCOUNTER — APPOINTMENT (OUTPATIENT)
Dept: PHYSICAL THERAPY | Facility: HOSPITAL | Age: 68
End: 2018-07-06

## 2018-07-27 ENCOUNTER — DOCUMENTATION (OUTPATIENT)
Dept: PHYSICAL THERAPY | Facility: HOSPITAL | Age: 68
End: 2018-07-27

## 2018-07-27 NOTE — THERAPY DISCHARGE NOTE
Outpatient Physical Therapy Discharge Summary         Patient Name: Radha Del Rosario  : 1950  MRN: [id number]    Today's Date: 2018    Visit Dx:  No diagnosis found.          PT OP Goals     Row Name 18 0800          PT Short Term Goals    STG Date to Achieve 18  -LS     STG 1 Patient/family will be independent with completion of initial HEP.  -LS     STG 1 Progress Met  -LS     STG 2 Patient will be compliant with walking >10 minutes >3 days a week for optimal symptom management.  -LS     STG 2 Progress Met  -LS     STG 3 Patient will report 25% reduction in pain with sleeping.  -LS     STG 3 Progress Met  -LS        Long Term Goals    LTG Date to Achieve 18  -LS     LTG 1 Patient/family will be independent with progressive HEP for long term management of current condition.  -LS     LTG 1 Progress Partially Met  -LS     LTG 1 Progress Comments Pt and family report good compliance with HEP.  -LS     LTG 2 Patient will score </=38% disability on the KOS-ADL to indicate improved perceived performance with ADLs.  -LS     LTG 2 Progress Partially Met  -LS     LTG 3 Patient will report 75% or greater reduction in pain with sleeping.  -LS     LTG 3 Progress Not Met  -LS     LTG 3 Progress Comments Pt reports some lingering pain at last session.  -LS     LTG 4 Patient will be compliant with walking >/=30 minutes/day 3x/week for optimal arthritis management.  -LS     LTG 4 Progress Not Met  -LS     LTG 4 Progress Comments Pt has not initiated independent walking.   -LS       User Key  (r) = Recorded By, (t) = Taken By, (c) = Cosigned By    Initials Name Provider Type    MARIELA Rosen PT Physical Therapist          OP PT Discharge Summary  Date of Discharge: 18  Reason for Discharge: Non-compliant  Outcomes Achieved: Patient able to partially acheive established goals  Discharge Destination: Home with home program  Discharge Instructions/Additional Comments: Pt and family attended  5 skilled PT sessions reporting good compliance with HEP and some improvement in overall pain. Pt did not return for follow up appts.       Time Calculation:        Therapy Suggested Charges     Code   Minutes Charges    None                       Maya Rosen, PT  7/27/2018